# Patient Record
Sex: MALE | Race: WHITE | NOT HISPANIC OR LATINO | Employment: FULL TIME | URBAN - METROPOLITAN AREA
[De-identification: names, ages, dates, MRNs, and addresses within clinical notes are randomized per-mention and may not be internally consistent; named-entity substitution may affect disease eponyms.]

---
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
## 2020-03-09 ENCOUNTER — RX ONLY (RX ONLY)
Age: 20
End: 2020-03-09

## 2020-03-09 ENCOUNTER — DOCTOR'S OFFICE (OUTPATIENT)
Dept: URBAN - METROPOLITAN AREA CLINIC 136 | Facility: CLINIC | Age: 20
Setting detail: OPHTHALMOLOGY
End: 2020-03-09
Payer: COMMERCIAL

## 2020-03-09 DIAGNOSIS — H52.13: ICD-10-CM

## 2020-03-09 PROBLEM — H04.123 DRY EYE; BOTH EYES: Status: ACTIVE | Noted: 2020-03-09

## 2020-03-09 PROCEDURE — 92015 DETERMINE REFRACTIVE STATE: CPT | Performed by: OPTOMETRIST

## 2020-03-09 PROCEDURE — 92002 INTRM OPH EXAM NEW PATIENT: CPT | Performed by: OPTOMETRIST

## 2020-03-09 ASSESSMENT — REFRACTION_MANIFEST
OD_VA2: 20/20
OS_CYLINDER: SPH
OS_VA1: 20/20
OD_VA1: 20/20
OD_SPHERE: -1.00
OS_VA2: 20/20
OD_CYLINDER: SPH
OS_SPHERE: -1.25

## 2020-03-09 ASSESSMENT — KERATOMETRY
OD_AXISANGLE_DEGREES: 067
OS_K2POWER_DIOPTERS: 44.25
OS_K1POWER_DIOPTERS: 44.25
OD_K2POWER_DIOPTERS: 44.50
OD_K1POWER_DIOPTERS: 44.00

## 2020-03-09 ASSESSMENT — SPHEQUIV_DERIVED
OS_SPHEQUIV: -1.625
OD_SPHEQUIV: -1.625

## 2020-03-09 ASSESSMENT — SUPERFICIAL PUNCTATE KERATITIS (SPK)
OD_SPK: 1+
OS_SPK: 1+

## 2020-03-09 ASSESSMENT — REFRACTION_AUTOREFRACTION
OS_CYLINDER: -0.25
OS_SPHERE: -1.50
OD_CYLINDER: -0.25
OD_AXIS: 141
OD_SPHERE: -1.50
OS_AXIS: 046

## 2020-03-09 ASSESSMENT — CONFRONTATIONAL VISUAL FIELD TEST (CVF)
OS_FINDINGS: FULL
OD_FINDINGS: FULL

## 2020-03-09 ASSESSMENT — AXIALLENGTH_DERIVED
OD_AL: 23.955
OS_AL: 23.955

## 2020-03-09 ASSESSMENT — VISUAL ACUITY
OD_BCVA: 20/30-2
OS_BCVA: 20/30+2

## 2020-03-09 ASSESSMENT — TEAR BREAK UP TIME (TBUT)
OD_TBUT: 2+
OS_TBUT: 2+

## 2020-06-04 ENCOUNTER — TELEPHONE (OUTPATIENT)
Dept: FAMILY MEDICINE CLINIC | Facility: CLINIC | Age: 20
End: 2020-06-04

## 2020-06-04 DIAGNOSIS — Z20.822 CLOSE EXPOSURE TO COVID-19 VIRUS: Primary | ICD-10-CM

## 2020-07-09 ENCOUNTER — TRANSCRIBE ORDERS (OUTPATIENT)
Dept: FAMILY MEDICINE CLINIC | Facility: CLINIC | Age: 20
End: 2020-07-09

## 2020-07-09 ENCOUNTER — TELEPHONE (OUTPATIENT)
Dept: FAMILY MEDICINE CLINIC | Facility: CLINIC | Age: 20
End: 2020-07-09

## 2020-07-09 DIAGNOSIS — Z13.21 ENCOUNTER FOR VITAMIN DEFICIENCY SCREENING: Primary | ICD-10-CM

## 2020-07-09 NOTE — TELEPHONE ENCOUNTER
Patient's mother aware  Claims she has new insurance so she would be okay with billing   I will be entering in chart for dad to
